# Patient Record
Sex: MALE | Race: WHITE | NOT HISPANIC OR LATINO | URBAN - METROPOLITAN AREA
[De-identification: names, ages, dates, MRNs, and addresses within clinical notes are randomized per-mention and may not be internally consistent; named-entity substitution may affect disease eponyms.]

---

## 2018-03-14 DIAGNOSIS — N52.9 ERECTILE DYSFUNCTION, UNSPECIFIED ERECTILE DYSFUNCTION TYPE: Primary | ICD-10-CM

## 2018-03-14 NOTE — TELEPHONE ENCOUNTER
patient called   He is looking for a refill Viagra  100 mg takes once daily  8 pills insurance will cover with multple refills  Send to [facility]

## 2018-03-15 RX ORDER — SILDENAFIL 100 MG/1
100 TABLET, FILM COATED ORAL DAILY PRN
Qty: 8 TABLET | Refills: 2 | Status: SHIPPED | OUTPATIENT
Start: 2018-03-15 | End: 2018-04-14

## 2018-04-25 RX ORDER — TADALAFIL 20 MG/1
20 TABLET ORAL
Qty: 8 TABLET | Refills: 11 | Status: SHIPPED | OUTPATIENT
Start: 2018-04-25 | End: 2019-01-30 | Stop reason: SDUPTHER

## 2018-04-25 RX ORDER — TADALAFIL 20 MG/1
20 TABLET ORAL
COMMUNITY
Start: 2017-06-13 | End: 2018-04-25 | Stop reason: SDUPTHER

## 2018-04-25 NOTE — TELEPHONE ENCOUNTER
Vanesa from pharmacy called and the patient wants to go back to Cialis  20 mg takes 1 every 36 hours  8 pills in the script at a time with 11 refills  Ins pays this way

## 2018-11-13 ENCOUNTER — TELEPHONE (OUTPATIENT)
Dept: PRIMARY CARE | Facility: CLINIC | Age: 63
End: 2018-11-13

## 2018-11-13 DIAGNOSIS — Z11.3 SCREENING EXAMINATION FOR STD (SEXUALLY TRANSMITTED DISEASE): Primary | ICD-10-CM

## 2018-11-13 NOTE — TELEPHONE ENCOUNTER
Pt called and asked for us to mail a [facility] lab slip to check all STDS to his home address [email or web address] thanks

## 2018-11-18 LAB
HCV AB S/CO SERPL IA: <0.1 S/CO RATIO (ref 0–0.9)
HIV 1+2 AB+HIV1 P24 AG SERPL QL IA: NON REACTIVE
RPR SER QL: NON REACTIVE

## 2018-11-19 LAB
HSV1 IGG SER IA-ACNC: 11.8 INDEX (ref 0–0.9)
HSV2 IGG SER IA-ACNC: <0.91 INDEX (ref 0–0.9)

## 2018-11-20 ENCOUNTER — TELEPHONE (OUTPATIENT)
Dept: PRIMARY CARE | Facility: CLINIC | Age: 63
End: 2018-11-20

## 2018-11-20 NOTE — LETTER
November 20, 2018     Eligio Degroot  [address] [address]      Dear Mr. Degroot:    Below are the results from your recent visit:    The test results show that they are within normal range. Please If you have any questions or concerns, please do not hesitate to call.         Sincerely,        Kushal Mejia MD

## 2018-11-21 ENCOUNTER — OFFICE VISIT (OUTPATIENT)
Dept: PRIMARY CARE | Facility: CLINIC | Age: 63
End: 2018-11-21
Payer: COMMERCIAL

## 2018-11-21 VITALS
OXYGEN SATURATION: 94 % | SYSTOLIC BLOOD PRESSURE: 120 MMHG | BODY MASS INDEX: 26.9 KG/M2 | DIASTOLIC BLOOD PRESSURE: 80 MMHG | HEIGHT: 73 IN | TEMPERATURE: 97.9 F | RESPIRATION RATE: 17 BRPM | HEART RATE: 91 BPM | WEIGHT: 203 LBS

## 2018-11-21 DIAGNOSIS — J20.9 ACUTE BRONCHITIS, UNSPECIFIED ORGANISM: Primary | ICD-10-CM

## 2018-11-21 PROBLEM — E78.5 HYPERLIPIDEMIA: Status: ACTIVE | Noted: 2017-09-21

## 2018-11-21 PROBLEM — E78.5 DYSLIPIDEMIA: Status: ACTIVE | Noted: 2017-09-21

## 2018-11-21 PROBLEM — K80.20 GALLSTONE: Status: ACTIVE | Noted: 2017-09-21

## 2018-11-21 PROBLEM — F41.9 ANXIETY: Status: ACTIVE | Noted: 2017-09-21

## 2018-11-21 PROBLEM — K63.5 COLON POLYP: Status: ACTIVE | Noted: 2017-09-21

## 2018-11-21 PROBLEM — N52.9 MALE ERECTILE DISORDER: Status: ACTIVE | Noted: 2017-09-21

## 2018-11-21 PROBLEM — G47.33 OBSTRUCTIVE SLEEP APNEA SYNDROME: Status: ACTIVE | Noted: 2017-09-21

## 2018-11-21 PROBLEM — S22.49XA MULTIPLE RIB FRACTURES: Status: ACTIVE | Noted: 2018-11-21

## 2018-11-21 PROCEDURE — 99213 OFFICE O/P EST LOW 20 MIN: CPT | Performed by: NURSE PRACTITIONER

## 2018-11-21 RX ORDER — ROSUVASTATIN CALCIUM 10 MG/1
TABLET, COATED ORAL
COMMUNITY
Start: 2009-06-19

## 2018-11-21 RX ORDER — NIACIN 1000 MG/1
TABLET, EXTENDED RELEASE ORAL
COMMUNITY
Start: 2018-11-16

## 2018-11-21 RX ORDER — BUDESONIDE AND FORMOTEROL FUMARATE DIHYDRATE 80; 4.5 UG/1; UG/1
2 AEROSOL RESPIRATORY (INHALATION)
Qty: 1 INHALER | Refills: 0 | Status: SHIPPED | OUTPATIENT
Start: 2018-11-21 | End: 2019-05-20

## 2018-11-21 RX ORDER — OMEGA-3-ACID ETHYL ESTERS 1 G/1
CAPSULE, LIQUID FILLED ORAL
COMMUNITY
Start: 2009-06-19

## 2018-11-21 RX ORDER — VIT C/E/ZN/COPPR/LUTEIN/ZEAXAN 250MG-90MG
1000 CAPSULE ORAL
COMMUNITY

## 2018-11-21 RX ORDER — BENZONATATE 100 MG/1
100 CAPSULE ORAL 3 TIMES DAILY PRN
Qty: 30 CAPSULE | Refills: 0 | Status: SHIPPED | OUTPATIENT
Start: 2018-11-21 | End: 2018-12-01

## 2018-11-21 RX ORDER — ALBUTEROL SULFATE 90 UG/1
2 INHALANT RESPIRATORY (INHALATION)
COMMUNITY
Start: 2018-11-17 | End: 2019-11-17

## 2018-11-21 ASSESSMENT — ENCOUNTER SYMPTOMS
NEUROLOGICAL NEGATIVE: 1
SHORTNESS OF BREATH: 0
CARDIOVASCULAR NEGATIVE: 1
FEVER: 0
CHILLS: 0
COUGH: 1
PSYCHIATRIC NEGATIVE: 1

## 2018-11-21 NOTE — PROGRESS NOTES
"Subjective      Patient ID: Eligio Degroot is a 63 y.o. male.  1955      Pt presents w/ c/o cough x 3 days. Pt states he had a cold last week and then developed a productive cough. Went to  on Saturday and was prescribed mucinex DM and albuterol inhaler, and was given a steroid. CXR obtained in  was negative.  States cough is interfering w/ sleep. Denies fevers, chills, CP, SOB.         The following have been reviewed and updated as appropriate in this visit:  Tobacco  Allergies  Meds  Med Hx  Surg Hx  Fam Hx  Soc Hx      Review of Systems   Constitutional: Negative for chills and fever.   HENT: Negative.    Respiratory: Positive for cough. Negative for shortness of breath.    Cardiovascular: Negative.    Skin: Negative.    Neurological: Negative.    Psychiatric/Behavioral: Negative.        Objective     Vitals:    11/21/18 1259   BP: 120/80   BP Location: Right upper arm   Patient Position: Sitting   Pulse: 91   Resp: 17   Temp: 36.6 °C (97.9 °F)   SpO2: 94%   Weight: 92.1 kg (203 lb)   Height: 1.854 m (6' 1\")     Body mass index is 26.78 kg/m².    Physical Exam   Constitutional: Vital signs are normal. He appears well-developed and well-nourished.   HENT:   Head: Normocephalic and atraumatic.   Right Ear: Hearing, tympanic membrane, external ear and ear canal normal.   Left Ear: Hearing, tympanic membrane, external ear and ear canal normal.   Nose: Nose normal.   Mouth/Throat: Uvula is midline, oropharynx is clear and moist and mucous membranes are normal.   Cardiovascular: Normal rate, S1 normal, S2 normal, normal heart sounds and normal pulses.    Pulmonary/Chest: Effort normal and breath sounds normal.   Psychiatric: He has a normal mood and affect. His behavior is normal.   Vitals reviewed.      Assessment/Plan   Problem List Items Addressed This Visit     Acute bronchitis - Primary     Pt prescribed Symbicort inhaler and Tessalon perles, educated on how to use inhaler. Pt to continue taking " Mucinex-DM. RTO/ call if symptoms are not improving.          Relevant Medications    albuterol HFA (VENTOLIN HFA) 90 mcg/actuation inhaler    dextromethorphan-guaifenesin (MUCINEX DM)  mg tablet extended release 12 hr    budesonide-formoterol (SYMBICORT) 80-4.5 mcg/actuation inhaler    benzonatate (TESSALON PERLES) 100 mg capsule

## 2018-11-21 NOTE — ASSESSMENT & PLAN NOTE
Pt prescribed Symbicort inhaler and Tessalon perles, educated on how to use inhaler. Pt to continue taking Mucinex-DM. RTO/ call if symptoms are not improving.

## 2019-01-30 RX ORDER — TADALAFIL 20 MG/1
TABLET ORAL
Qty: 8 TABLET | Refills: 0 | Status: SHIPPED | OUTPATIENT
Start: 2019-01-30 | End: 2019-03-16 | Stop reason: SDUPTHER

## 2019-02-21 RX ORDER — NAFTIFINE HYDROCHLORIDE 20 MG/G
CREAM TOPICAL
Qty: 45 G | Refills: 0 | Status: SHIPPED | OUTPATIENT
Start: 2019-02-21

## 2019-03-18 RX ORDER — TADALAFIL 20 MG/1
TABLET ORAL
Qty: 8 TABLET | Refills: 0 | Status: SHIPPED | OUTPATIENT
Start: 2019-03-18

## 2019-03-18 RX ORDER — TADALAFIL 20 MG/1
TABLET ORAL
Qty: 8 TABLET | Refills: 0 | Status: SHIPPED | OUTPATIENT
Start: 2019-03-18 | End: 2019-03-18 | Stop reason: SDUPTHER

## 2019-05-13 ENCOUNTER — TELEPHONE (OUTPATIENT)
Dept: PRIMARY CARE | Facility: CLINIC | Age: 64
End: 2019-05-13

## 2019-05-13 NOTE — TELEPHONE ENCOUNTER
Called to inform patient of lab results no answer.  Left message to give office a call back. Jessica Daniels MA      ----- Message from Kushal Mejia MD sent at 5/13/2019 11:44 AM EDT -----  HSV 1 positve antibody.   2 is negative.

## 2020-07-30 RX ORDER — NIACIN 1000 MG/1
TABLET, EXTENDED RELEASE ORAL
COMMUNITY
Start: 2019-03-04

## 2020-07-30 RX ORDER — AZITHROMYCIN 250 MG/1
TABLET, FILM COATED ORAL
COMMUNITY
Start: 2019-11-06

## 2020-07-30 RX ORDER — ROSUVASTATIN CALCIUM 40 MG/1
40 TABLET, COATED ORAL DAILY
COMMUNITY
Start: 2019-04-02

## 2020-07-30 RX ORDER — ROSUVASTATIN CALCIUM 20 MG/1
TABLET, COATED ORAL
COMMUNITY
Start: 2019-03-04

## 2020-07-30 RX ORDER — OMEGA-3-ACID ETHYL ESTERS 1 G/1
CAPSULE, LIQUID FILLED ORAL
COMMUNITY
Start: 2019-03-04

## 2020-07-30 RX ORDER — TERBINAFINE HYDROCHLORIDE 250 MG/1
TABLET ORAL
COMMUNITY
Start: 2020-07-03

## 2020-07-30 RX ORDER — TADALAFIL 20 MG/1
TABLET ORAL
COMMUNITY
Start: 2017-05-17

## 2020-07-30 RX ORDER — NAFTIFINE HYDROCHLORIDE 20 MG/G
CREAM TOPICAL
COMMUNITY
Start: 2019-11-13

## 2020-07-30 RX ORDER — EZETIMIBE 10 MG/1
TABLET ORAL
COMMUNITY
Start: 2020-07-28

## 2020-07-30 RX ORDER — ROSUVASTATIN CALCIUM 40 MG/1
TABLET, COATED ORAL
COMMUNITY
Start: 2020-07-28

## 2020-07-30 RX ORDER — NAPROXEN SODIUM 220 MG/1
81 TABLET, FILM COATED ORAL
COMMUNITY
Start: 2019-04-01

## 2020-07-30 RX ORDER — CODEINE PHOSPHATE AND GUAIFENESIN 10; 100 MG/5ML; MG/5ML
SOLUTION ORAL
COMMUNITY
Start: 2019-11-13

## 2020-07-30 RX ORDER — CLOBETASOL PROPIONATE 0.05 MG/G
GEL TOPICAL
COMMUNITY
Start: 2019-11-13

## 2020-07-30 RX ORDER — VIT C/E/ZN/COPPR/LUTEIN/ZEAXAN 250MG-90MG
1000 CAPSULE ORAL
COMMUNITY

## 2020-07-30 RX ORDER — ROSUVASTATIN CALCIUM 10 MG/1
TABLET, COATED ORAL
COMMUNITY
Start: 2019-04-01

## 2020-07-31 RX ORDER — MOMETASONE FUROATE 1 MG/G
CREAM TOPICAL
Qty: 30 G | Refills: 3 | Status: SHIPPED | OUTPATIENT
Start: 2020-07-31

## 2021-04-13 DIAGNOSIS — Z23 ENCOUNTER FOR IMMUNIZATION: ICD-10-CM

## 2022-09-21 ENCOUNTER — TRANSCRIBE ORDERS (OUTPATIENT)
Dept: SCHEDULING | Age: 67
End: 2022-09-21

## 2022-09-21 DIAGNOSIS — Z98.890 OTHER SPECIFIED POSTPROCEDURAL STATES: ICD-10-CM

## 2022-09-21 DIAGNOSIS — M48.07 SPINAL STENOSIS, LUMBOSACRAL REGION: Primary | ICD-10-CM

## 2022-09-23 ENCOUNTER — HOSPITAL ENCOUNTER (OUTPATIENT)
Dept: RADIOLOGY | Age: 67
Discharge: HOME | End: 2022-09-23
Attending: INTERNAL MEDICINE
Payer: MEDICARE

## 2022-09-23 DIAGNOSIS — Z98.890 OTHER SPECIFIED POSTPROCEDURAL STATES: ICD-10-CM

## 2022-09-23 DIAGNOSIS — M48.07 SPINAL STENOSIS, LUMBOSACRAL REGION: ICD-10-CM

## 2023-06-19 ENCOUNTER — TRANSCRIBE ORDERS (OUTPATIENT)
Dept: SCHEDULING | Age: 68
End: 2023-06-19